# Patient Record
Sex: MALE | Race: WHITE
[De-identification: names, ages, dates, MRNs, and addresses within clinical notes are randomized per-mention and may not be internally consistent; named-entity substitution may affect disease eponyms.]

---

## 2018-12-10 ENCOUNTER — HOSPITAL ENCOUNTER (EMERGENCY)
Dept: HOSPITAL 80 - FED | Age: 43
Discharge: HOME | End: 2018-12-10
Payer: MEDICAID

## 2018-12-10 VITALS — SYSTOLIC BLOOD PRESSURE: 125 MMHG | DIASTOLIC BLOOD PRESSURE: 82 MMHG

## 2018-12-10 DIAGNOSIS — T24.292A: Primary | ICD-10-CM
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
## 2018-12-10 DIAGNOSIS — E86.9: ICD-10-CM

## 2018-12-10 DIAGNOSIS — T31.0: ICD-10-CM

## 2018-12-10 PROCEDURE — 0HDLXZZ EXTRACTION OF LEFT LOWER LEG SKIN, EXTERNAL APPROACH: ICD-10-PCS | Performed by: EMERGENCY MEDICINE

## 2018-12-10 PROCEDURE — 2W29X4Z DRESSING OF LEFT UPPER EXTREMITY USING BANDAGE: ICD-10-PCS | Performed by: EMERGENCY MEDICINE

## 2018-12-10 RX ADMIN — SODIUM CHLORIDE ONE MLS: 900 INJECTION, SOLUTION INTRAVENOUS at 01:21

## 2018-12-10 RX ADMIN — OXYCODONE HYDROCHLORIDE AND ACETAMINOPHEN ONE TAB: 5; 325 TABLET ORAL at 02:00

## 2018-12-10 RX ADMIN — KETOROLAC TROMETHAMINE ONE MG: 15 INJECTION, SOLUTION INTRAMUSCULAR; INTRAVENOUS at 01:20

## 2018-12-10 RX ADMIN — OXYCODONE AND ACETAMINOPHEN ONE BTL: 5; 325 TABLET ORAL at 03:07
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
